# Patient Record
Sex: FEMALE | Race: ASIAN
[De-identification: names, ages, dates, MRNs, and addresses within clinical notes are randomized per-mention and may not be internally consistent; named-entity substitution may affect disease eponyms.]

---

## 2017-06-06 ENCOUNTER — HOSPITAL ENCOUNTER (OUTPATIENT)
Dept: HOSPITAL 62 - END | Age: 62
Discharge: HOME | End: 2017-06-06
Attending: INTERNAL MEDICINE
Payer: OTHER GOVERNMENT

## 2017-06-06 VITALS — DIASTOLIC BLOOD PRESSURE: 65 MMHG | SYSTOLIC BLOOD PRESSURE: 106 MMHG

## 2017-06-06 DIAGNOSIS — Z12.11: Primary | ICD-10-CM

## 2017-06-06 DIAGNOSIS — D12.3: ICD-10-CM

## 2017-06-06 DIAGNOSIS — Z79.899: ICD-10-CM

## 2017-06-06 PROCEDURE — 45380 COLONOSCOPY AND BIOPSY: CPT

## 2017-06-06 PROCEDURE — 0DBL8ZX EXCISION OF TRANSVERSE COLON, VIA NATURAL OR ARTIFICIAL OPENING ENDOSCOPIC, DIAGNOSTIC: ICD-10-PCS | Performed by: INTERNAL MEDICINE

## 2017-06-06 PROCEDURE — 88305 TISSUE EXAM BY PATHOLOGIST: CPT

## 2017-06-06 RX ADMIN — MIDAZOLAM HYDROCHLORIDE ONE MG: 1 INJECTION, SOLUTION INTRAMUSCULAR; INTRAVENOUS at 12:20

## 2017-06-06 RX ADMIN — FENTANYL CITRATE ONE MCG: 50 INJECTION INTRAMUSCULAR; INTRAVENOUS at 12:27

## 2017-06-06 RX ADMIN — MIDAZOLAM HYDROCHLORIDE ONE MG: 1 INJECTION, SOLUTION INTRAMUSCULAR; INTRAVENOUS at 12:24

## 2017-06-06 RX ADMIN — FENTANYL CITRATE ONE MCG: 50 INJECTION INTRAMUSCULAR; INTRAVENOUS at 12:22

## 2017-06-06 NOTE — OPERATIVE REPORT
Operative Report


DATE OF SURGERY: 06/06/17


Operative Report: 


Risks, benefits and alternatives of the procedure including the risks of 

bleeding, perforation requiring surgery are explained to the patient detail and 

informed consent was obtained.  Patient was taken back to the endoscopy suite 

and placed in the left, lateral decubital position.  Timeout was called.  

Conscious sedation medications are provided.  An Olympus video scope was 

inserted into the patient's rectum.  It is carefully guided all the way to the 

cecum.  The cecum was identified by the usual anatomical landmarks including 

the ileocecal valve as well as the appendiceal office.  Photodocumentation was 

obtained.  The scope was then sequentially pulled back via the rest segments of 

the colon including the ascending colon, hepatic flexure, transverse colon, 

splenic flexure, descending colon and finding to the rectosigmoid portions of 

the colon.  Photodocumentation was obtained.  Prep was good.  Retroflexion 

maneuver was performed.


PREOPERATIVE DIAGNOSIS: Colorectal cancer screening.


POSTOPERATIVE DIAGNOSIS: Small colon polyp removed via biopsy forceps.


OPERATION: Colonoscopy with biopsy


SURGEON: KARTHIK DOWLING


ANESTHESIA: Moderate Sedation - 3 mg of Versed, 75 mcg of fentanyl.


TISSUE REMOVED OR ALTERED: colon Specimen retrieved


COMPLICATIONS: 


None.


ESTIMATED BLOOD LOSS: None.


INTRAOPERATIVE FINDINGS: As described above.  No AVMs diverticulosis noted.


PROCEDURE: 


Patient tolerated the procedure well.


No immediate postprocedure complications are noted.


Patient discharged in good condition.


Discharge date 6/6/2017.


Discharge diet: Regular.


Discharge activity: Regular.


2-3 week follow-up to discuss findings.


We will wait on pathology of the polyp.


We will likely need a 5 year surveillance.


Patient is instructed to call the office or proceed to the emergency room 

should there be any further problems or questions.

## 2017-08-22 ENCOUNTER — HOSPITAL ENCOUNTER (OUTPATIENT)
Dept: HOSPITAL 62 - WI | Age: 62
End: 2017-08-22
Attending: FAMILY MEDICINE
Payer: OTHER GOVERNMENT

## 2017-08-22 DIAGNOSIS — M85.89: Primary | ICD-10-CM

## 2017-08-22 PROCEDURE — 77080 DXA BONE DENSITY AXIAL: CPT

## 2017-08-22 NOTE — WOMENS IMAGING REPORT
EXAM DESCRIPTION:  BONE DENSITY HIP/SPINE



COMPLETED DATE/TIME:  8/22/2017 1:09 pm



REASON FOR STUDY:  OSTEOPENIA; M85.89 M85.89  OTH DISRD OF BONE DENSITY AND STRUCTURE, MULTIPLE SIT



COMPARISON:   None.



TECHNIQUE:  Dual-Energy X-ray Absorptiometry (DEXA) of the AP Spine and Hip.



LIMITATIONS:  None.



FINDINGS:  LUMBAR SPINE:

The bone mineral density (BMD) measured from L1-L4 in the AP projection correlates with a T-score of 
-2.0, which is osteopenia as defined by the World Health Organization.

HIP:

The bone mineral density (BMD) measured in the left hip correlates with a T-score of -1.6, which is o
steopenia as defined by the World Health Organization.



IMPRESSION:  1. LUMBAR SPINE: OSTEOPENIA.

2.  HIP: OSTEOPENIA.



COMMENT:  The World Health Organization defines low BMD as follows:

T-score:

Normal:  Greater than -1.0

Osteopenia: Between -1.0 and -2.5

Osteoporosis:  Less than -2.5 without fractures

Established osteoporosis:  Less than -2.5 with fractures

In general, you may wish to consider:

Diagnosis          Treatment                     Follow-up DEXA

Normal BMD      Prevention                    2-3 years

Osteopenia       Prevention/Therapy        1-2 years

Osteoporosis     Therapy                        Yearly



TECHNICAL DOCUMENTATION:  JOB ID:  [id number]

 2011 [employer]- All Rights Reserved

## 2018-06-25 ENCOUNTER — HOSPITAL ENCOUNTER (OUTPATIENT)
Dept: HOSPITAL 62 - RAD | Age: 63
End: 2018-06-25
Attending: FAMILY MEDICINE
Payer: COMMERCIAL

## 2018-06-25 DIAGNOSIS — R22.31: Primary | ICD-10-CM

## 2018-06-25 PROCEDURE — 76882 US LMTD JT/FCL EVL NVASC XTR: CPT

## 2018-06-25 NOTE — RADIOLOGY REPORT (SQ)
EXAM DESCRIPTION:  U/S EXTREMITY NONVASCULAR LTD



COMPLETED DATE/TIME:  6/25/2018 1:56 pm



REASON FOR STUDY:  MASS OF RIGHT UPPER EXTREMITY 1CM FATTY NODULE PALPATED RIGHT POSTERIOR UPP R22.31
  LOCALIZED SWELLING, MASS AND LUMP, RIGHT UPPER LIMB



COMPARISON:  None.



TECHNIQUE:  Dynamic and static grayscale images acquired of the localized site of clinical concern an
d recorded on PACS. Additional selected color Doppler and spectral images recorded.

SITE OF CONCERN: Upper posterior right arm.



LIMITATIONS:  None.



FINDINGS:  Oval area of increased echogenicity just below the skin surface, measuring 0.9 x 1.8 cm.  
No vascularity on Doppler imaging.  Similar configuration to the subcutaneous fat.



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



TECHNICAL DOCUMENTATION:  JOB ID:  [id number]

 2011 [employer]- All Rights Reserved



Reading location - IP/workstation name: [email or web address]

## 2018-12-14 ENCOUNTER — HOSPITAL ENCOUNTER (OUTPATIENT)
Dept: HOSPITAL 62 - WI | Age: 63
End: 2018-12-14
Attending: FAMILY MEDICINE
Payer: COMMERCIAL

## 2018-12-14 DIAGNOSIS — Z12.31: Primary | ICD-10-CM

## 2018-12-14 PROCEDURE — 77067 SCR MAMMO BI INCL CAD: CPT

## 2018-12-14 NOTE — WOMENS IMAGING REPORT
EXAM DESCRIPTION:  BILAT SCREENING MAMMO W/CAD



COMPLETED DATE/TIME:  12/14/2018 11:16 am



REASON FOR STUDY:  BILATERAL SCREENING MAMMO /Z12.31 Z12.31  ENCNTR SCREEN MAMMOGRAM FOR MALIGNANT NE
OPLASM OF SARA



COMPARISON:  Multiple since 2009



TECHNIQUE:  Standard craniocaudal and mediolateral oblique views of each breast recorded using [employer] acquisition.



LIMITATIONS:  None.



FINDINGS:  No masses, calcifications or architectural distortion. No areas of suspicion.

Read with the assistance of CAD.

.[facility] - R2 [employer] Version 1.3

.[facility] - R2 [employer] Version 1.3

.[facility] - R2 Cenova Version 2.4

.[facility] - R2 Cenova Version 2.4

.[facility] - R2  Version 9.2



IMPRESSION:  NORMAL MAMMOGRAM.  BIRADS 1.



BREAST DENSITY:  c. The breasts are heterogeneously dense, which may obscure small masses.



BIRAD:  1 NEGATIVE



RECOMMENDATION:  ROUTINE SCREENING

Please continue yearly bilateral screening mammography/tomosynthesis in December 2019



COMMENT:  The patient has been notified of the results by letter per SA requirements. Additional no
tification policies are in place for contacting patient with suspicious or incomplete findings.

Quality ID #225: The American College of Radiology recommends an annual screening mammogram for women
 aged 40 years or over. This facility utilizes a reminder system to ensure that all patients receive 
reminder letters, and/or direct phone calls for appointments. This includes reminders for routine scr
eening mammograms, diagnostic mammograms, or other Breast Imaging Interventions when appropriate.  Th
is patient will be placed in the appropriate reminder system.

The American College of Radiology (ACR) has developed recommendations for screening MRI of the breast
s in certain patient populations, to be used in conjunction with mammography.  Breast MRI surveillanc
e may be appropriate for women with more than 20% lifetime risk of developing breast cancer  as deter
mined by genetic testing, significant family history of the disease, or history of mantle radiation f
or Hodgkins Disease.  ACR Practice Guidelines 2008.



TECHNICAL DOCUMENTATION:  FINDING NUMBER: (1)

ASSESSMENT: (1)

JOB ID:  [id number]

 2011 [employer]- All Rights Reserved



Reading location - IP/workstation name: [facility]

## 2020-02-10 ENCOUNTER — HOSPITAL ENCOUNTER (OUTPATIENT)
Dept: HOSPITAL 62 - WI | Age: 65
End: 2020-02-10
Attending: FAMILY MEDICINE
Payer: COMMERCIAL

## 2020-02-10 DIAGNOSIS — M81.0: ICD-10-CM

## 2020-02-10 DIAGNOSIS — Z12.31: Primary | ICD-10-CM

## 2020-02-10 PROCEDURE — 77067 SCR MAMMO BI INCL CAD: CPT

## 2020-02-10 PROCEDURE — 77080 DXA BONE DENSITY AXIAL: CPT

## 2020-02-10 NOTE — WOMENS IMAGING REPORT
EXAM DESCRIPTION:  BONE DENSITY HIP/SPINE



COMPLETED DATE/TIME:  2/10/2020 2:26 pm



REASON FOR STUDY:  M81.0 BONE DENSITY M81.0  AGE-RELATED OSTEOPOROSIS W/O CURRENT PATHOLOGICAL FRAC Z
12.31  ENCNTR SCREEN MAMMOGRAM FOR MALIGNANT NEOPLASM OF SARA



COMPARISON:   8/22/2017 -9/14/2006



TECHNIQUE:  Dual-Energy X-ray Absorptiometry (DEXA) of the AP Spine and Hip.



LIMITATIONS:  None.



FINDINGS:  LUMBAR SPINE:

The bone mineral density (BMD) measured from L1-L4 in the AP projection correlates with a T-score of 
-1.9, which is osteopenia as defined by the World Health Organization.

BMD Change vs Baseline:  +14.7%

HIP:

The bone mineral density (BMD) measured in the left hip correlates with a T-score of -1.3 in the femo
ral neck, which is osteopenia as defined by the World Health Organization.

BMD Change vs Baseline:  +14%

10 year Fracture Risk Assessment:

Major Osteoporotic Fracture:  4.1%

Hip Fracture:  0.4%



IMPRESSION:  1. LUMBAR SPINE WHO CLASSIFICATION: Osteopenia

2. HIP WHO CLASSIFICATION: Osteopenia

OVERALL ASSESSMENT:

WHO CLASSIFICATION:  Osteopenia



COMMENT:  The World Health Organization defines low BMD as follows:

T-score:

Normal:  Greater than -1.0

Osteopenia: Between -1.0 and -2.5

Osteoporosis:  Less than -2.5 without fractures

Established osteoporosis:  Less than -2.5 with fractures

In general, you may wish to consider:

Diagnosis          Treatment                     Follow-up DEXA

Normal BMD      Prevention                    2-3 years

Osteopenia       Prevention/Therapy        1-2 years

Osteoporosis     Therapy                        Yearly



TECHNICAL DOCUMENTATION:  JOB ID:  [id number]

 2011 [employer]- All Rights Reserved



Reading location - IP/workstation name: [email or web address]

## 2020-02-11 NOTE — WOMENS IMAGING REPORT
EXAM DESCRIPTION:  BILAT SCREENING MAMMO W/CAD



COMPLETED DATE/TIME:  2/10/2020 2:27 pm



REASON FOR STUDY:  Z12.31 SCREENING MAMMO M81.0  AGE-RELATED OSTEOPOROSIS W/O CURRENT PATHOLOGICAL FR
AC Z12.31  ENCNTR SCREEN MAMMOGRAM FOR MALIGNANT NEOPLASM OF SARA



COMPARISON:  Digital bilateral screening mammograms dated 12/14/2018, 11/3/2017 and 10/28/2016.



EXAM PARAMETERS:  Standard craniocaudal and mediolateral oblique views of each breast recorded using 
digital acquisition.

Read with the assistance of CAD.

.[facility] - "[employer]  Version 9.2



LIMITATIONS:  None.



FINDINGS:  Findings present which are benign by mammographic criteria.  No suspicious masses, calcifi
cations or architectural distortion.

Pertinent benign findings:  Stable calcifications in the breast.

Benign mammographic findings may include one or more of the following:  Smooth masses, popcorn/rim/co
arse calcifications, asymmetries, post-procedure changes, and lesions with long-standing stability.



IMPRESSION:  BENIGN MAMMOGRAPHIC FINDINGS.  BIRADS 2



BREAST DENSITY:  c. The breasts are heterogeneously dense, which may obscure small masses.



BIRAD:  ASSESSMENT:  2 BENIGN FINDING(S)



RECOMMENDATION:  1.  ROUTINE SCREENING



COMMENT:  The patient has been notified of the results by letter per SA requirements. Additional no
tification policies are in place for contacting patient with suspicious or incomplete findings.

Quality ID #225: The American College of Radiology recommends an annual screening mammogram for women
 aged 40 years or over. This facility utilizes a reminder system to ensure that all patients receive 
reminder letters, and/or direct phone calls for appointments. This includes reminders for routine scr
eening mammograms, diagnostic mammograms, or other Breast Imaging Interventions when appropriate.  Th
is patient will be placed in the appropriate reminder system.



TECHNICAL DOCUMENTATION:  FINDING NUMBER: (1)

ASSESSMENT: (1)

JOB ID:  [id number]

 2011 [employer]- All Rights Reserved



Reading location - IP/workstation name: [email or web address]